# Patient Record
Sex: FEMALE | Race: WHITE | Employment: FULL TIME | URBAN - METROPOLITAN AREA
[De-identification: names, ages, dates, MRNs, and addresses within clinical notes are randomized per-mention and may not be internally consistent; named-entity substitution may affect disease eponyms.]

---

## 2017-05-25 ENCOUNTER — OFFICE VISIT (OUTPATIENT)
Dept: INTERNAL MEDICINE CLINIC | Age: 22
End: 2017-05-25

## 2017-05-25 VITALS
RESPIRATION RATE: 16 BRPM | HEART RATE: 80 BPM | WEIGHT: 133 LBS | HEIGHT: 67 IN | DIASTOLIC BLOOD PRESSURE: 80 MMHG | BODY MASS INDEX: 20.88 KG/M2 | SYSTOLIC BLOOD PRESSURE: 122 MMHG

## 2017-05-25 DIAGNOSIS — Z00.00 ANNUAL PHYSICAL EXAM: Primary | ICD-10-CM

## 2017-05-25 PROCEDURE — 99385 PREV VISIT NEW AGE 18-39: CPT | Performed by: INTERNAL MEDICINE

## 2017-05-25 RX ORDER — NORGESTIMATE AND ETHINYL ESTRADIOL 0.25-0.035
1 KIT ORAL DAILY
COMMUNITY
End: 2018-04-11 | Stop reason: SDUPTHER

## 2018-04-11 ENCOUNTER — OFFICE VISIT (OUTPATIENT)
Dept: INTERNAL MEDICINE CLINIC | Age: 23
End: 2018-04-11

## 2018-04-11 VITALS
OXYGEN SATURATION: 100 % | DIASTOLIC BLOOD PRESSURE: 72 MMHG | SYSTOLIC BLOOD PRESSURE: 118 MMHG | BODY MASS INDEX: 23.21 KG/M2 | HEART RATE: 108 BPM | WEIGHT: 146 LBS

## 2018-04-11 DIAGNOSIS — Z00.00 ANNUAL PHYSICAL EXAM: ICD-10-CM

## 2018-04-11 DIAGNOSIS — Z30.40 ENCOUNTER FOR SURVEILLANCE OF CONTRACEPTIVES, UNSPECIFIED CONTRACEPTIVE: ICD-10-CM

## 2018-04-11 DIAGNOSIS — Z01.419 ENCOUNTER FOR GYNECOLOGICAL EXAMINATION WITHOUT ABNORMAL FINDING: Primary | ICD-10-CM

## 2018-04-11 PROCEDURE — 99395 PREV VISIT EST AGE 18-39: CPT | Performed by: INTERNAL MEDICINE

## 2018-04-11 RX ORDER — NORGESTIMATE AND ETHINYL ESTRADIOL 0.25-0.035
1 KIT ORAL DAILY
Qty: 1 PACKET | Refills: 12 | Status: SHIPPED | OUTPATIENT
Start: 2018-04-11 | End: 2019-04-15 | Stop reason: SDUPTHER

## 2018-10-11 LAB
CHOLESTEROL, TOTAL: 176 MG/DL
CHOLESTEROL/HDL RATIO: 1.9
GLUCOSE BLD-MCNC: 83 MG/DL
HDLC SERPL-MCNC: 92 MG/DL (ref 35–70)
LDL CHOLESTEROL CALCULATED: 62 MG/DL (ref 0–160)
TRIGL SERPL-MCNC: 110 MG/DL
VLDLC SERPL CALC-MCNC: ABNORMAL MG/DL

## 2019-04-15 ENCOUNTER — OFFICE VISIT (OUTPATIENT)
Dept: INTERNAL MEDICINE CLINIC | Age: 24
End: 2019-04-15
Payer: COMMERCIAL

## 2019-04-15 VITALS
HEIGHT: 66 IN | HEART RATE: 90 BPM | WEIGHT: 150 LBS | OXYGEN SATURATION: 98 % | DIASTOLIC BLOOD PRESSURE: 62 MMHG | BODY MASS INDEX: 24.11 KG/M2 | SYSTOLIC BLOOD PRESSURE: 118 MMHG

## 2019-04-15 DIAGNOSIS — Z30.40 ENCOUNTER FOR SURVEILLANCE OF CONTRACEPTIVES, UNSPECIFIED CONTRACEPTIVE: ICD-10-CM

## 2019-04-15 DIAGNOSIS — Z00.00 ANNUAL PHYSICAL EXAM: Primary | ICD-10-CM

## 2019-04-15 DIAGNOSIS — Z11.4 SCREENING FOR HIV (HUMAN IMMUNODEFICIENCY VIRUS): ICD-10-CM

## 2019-04-15 PROCEDURE — 99395 PREV VISIT EST AGE 18-39: CPT | Performed by: INTERNAL MEDICINE

## 2019-04-15 RX ORDER — NORGESTIMATE AND ETHINYL ESTRADIOL 0.25-0.035
1 KIT ORAL DAILY
Qty: 1 PACKET | Refills: 12 | Status: SHIPPED | OUTPATIENT
Start: 2019-04-15 | End: 2020-04-09 | Stop reason: SDUPTHER

## 2019-04-15 ASSESSMENT — PATIENT HEALTH QUESTIONNAIRE - PHQ9
SUM OF ALL RESPONSES TO PHQ9 QUESTIONS 1 & 2: 0
SUM OF ALL RESPONSES TO PHQ QUESTIONS 1-9: 0
2. FEELING DOWN, DEPRESSED OR HOPELESS: 0
SUM OF ALL RESPONSES TO PHQ QUESTIONS 1-9: 0
1. LITTLE INTEREST OR PLEASURE IN DOING THINGS: 0

## 2019-04-15 NOTE — PROGRESS NOTES
[facility]  History and Physical  Jhonny Brooks M.D.        David Sanchez  YOB: 1995    Date of Service:  4/15/2019    Chief Complaint:   David Sanchez is a 26 y.o. female who presents for   Chief Complaint   Patient presents with    Annual Exam       HPI: Here for Annual Physical and Follow up. Wt Readings from Last 3 Encounters:   04/15/19 150 lb (68 kg)   04/11/18 146 lb (66.2 kg)   05/25/17 133 lb (60.3 kg)     BP Readings from Last 3 Encounters:   04/15/19 118/62   04/11/18 118/72   05/25/17 122/80       There is no problem list on file for this patient. No Known Allergies  Outpatient Medications Marked as Taking for the 4/15/19 encounter (Office Visit) with Lashay Rodas MD   Medication Sig Dispense Refill    norgestimate-ethinyl estradiol ([id number]) 0.25-35 MG-MCG per tablet Take 1 tablet by mouth daily 1 packet 12       History reviewed. No pertinent past medical history. History reviewed. No pertinent surgical history.   Family History   Problem Relation Age of Onset    Diabetes Maternal Grandfather     Heart Attack Maternal Grandfather     High Blood Pressure Maternal Grandfather      Social History     Socioeconomic History    Marital status: Single     Spouse name: Not on file    Number of children: Not on file    Years of education: Not on file    Highest education level: Not on file   Occupational History    Not on file   Social Needs    Financial resource strain: Not on file    Food insecurity:     Worry: Not on file     Inability: Not on file    Transportation needs:     Medical: Not on file     Non-medical: Not on file   Tobacco Use    Smoking status: Never Smoker    Smokeless tobacco: Never Used   Substance and Sexual Activity    Alcohol use: Yes     Comment: Socially     Drug use: No    Sexual activity: Yes     Partners: Male   Lifestyle    Physical activity:     Days per week: Not on file     Minutes per session: Not on file    Stress: Not on file   Relationships    Social connections:     Talks on phone: Not on file     Gets together: Not on file     Attends Baptism service: Not on file     Active member of club or organization: Not on file     Attends meetings of clubs or organizations: Not on file     Relationship status: Not on file    Intimate partner violence:     Fear of current or ex partner: Not on file     Emotionally abused: Not on file     Physically abused: Not on file     Forced sexual activity: Not on file   Other Topics Concern    Not on file   Social History Narrative    Not on file       Review of Systems:  A comprehensive review of systems was negative except for what was noted in the HPI. Physical Exam:   Vitals:    04/15/19 1409   BP: 118/62   Pulse: 90   SpO2: 98%   Weight: 150 lb (68 kg)   Height: 5' 6\" (1.676 m)     Body mass index is 24.21 kg/m². Constitutional: She is oriented to person, place, and time. She appears well-developed and well-nourished. No distress. HEENT:   Head: Normocephalic and atraumatic. Right Ear: Tympanic membrane, external ear and ear canal normal.   Left Ear: Tympanic membrane, external ear and ear canal normal.   Mouth/Throat: Oropharynx is clear and moist, and mucous membranes are normal.  There is no cervical adenopathy. Eyes: Conjunctivae and extraocular motions are normal. Pupils are equal, round, and reactive to light. Neck: Supple. No JVD present. Carotid bruit is not present. No mass and no thyromegaly present. Cardiovascular: Normal rate, regular rhythm, normal heart sounds and intact distal pulses. Exam reveals no gallop and no friction rub. No murmur heard. Pulmonary/Chest: Effort normal and breath sounds normal. No respiratory distress. She has no wheezes, rhonchi or rales. Abdominal: Soft, non-tender. Bowel sounds and aorta are normal. She exhibits no organomegaly, mass or bruit. Musculoskeletal: Normal range of motion, no synovitis.  She exhibits no edema. Neurological: She is alert and oriented to person, place, and time. She has normal reflexes. No cranial nerve deficit. Coordination normal.   Skin: Skin is warm and dry. There is no rash or erythema. No suspicious lesions noted. Psychiatric: She has a normal mood and affect. Her speech is normal and behavior is normal. Judgment, cognition and memory are normal.       No results found for: LABA1C, LABMICR  Lab Results   Component Value Date    GLUCOSE 83 10/11/2018     Lab Results   Component Value Date    CHOL 176 10/11/2018    TRIG 110 10/11/2018    HDL 92 10/11/2018    LDLCALC 62 10/11/2018     No results found for: ALT, AST  No results found for: TSH, T4FREE  No results found for: WBC, HGB, HCT, MCV, PLT  No results found for: INR   No results found for: PSA   No results found for: [facility]         Preventive Care:  Health Maintenance   Topic Date Due    Varicella Vaccine (1 of 2 - 13+ 2-dose series) 08/02/2009    HPV vaccine (1 - Female 3-dose series) 08/02/2011    HIV screen  08/02/2011    Chlamydia screen  08/02/2012    DTaP/Tdap/Td vaccine (1 - Tdap) 08/02/2015    Flu vaccine (Season Ended) 09/01/2019    Cervical cancer screen  04/11/2021    Pneumococcal 0-64 years Vaccine  Aged Out      Hx abnormal PAP: no  Sexual activity: has sex with males   Last eye exam: 2018, normal  Exercise: aerobics 4 time(s) per week  Seatbelt use: yes       [facility]        4/15/2019           Preventive Measures Status       Recommendations for screening                   Diabetes Screen  Glucose (mg/dL)   Date Value   10/11/2018 83    Test recommended and ordered   Cholesterol Screen  Lab Results   Component Value Date    CHOL 176 10/11/2018    TRIG 110 10/11/2018    HDL 92 (A) 10/11/2018    LDLCALC 62 10/11/2018    Test recommended and ordered   Aspirin for Cardiovascular Prevention   No Not indicated   Weight: Body mass index is 24.21 kg/m².   5' 6\" (1.676 m)150 lb (68 kg) Your BMI is between 18.5 and 24.9, which indicates that you are at a healthy weight        Recommended Immunizations      There is no immunization history on file for this patient. Influenza vaccine:  recommended every fall         Other Recommendations ·   See a dentist every 6 months  · Try to get at least 30 minutes of exercise 3-5 days per week  · Always wear a seat belt when traveling in a car  · Always wear a helmet when riding a bicycle or motorcycle  · When exposed to the sun, use a sunscreen that protects against both UVA and UVB radiation with an SPF of 30 or greater- reapply every 2 to 3 hours or after sweating, drying off with a towel, or swimming                 Assessment/Plan:    Boone Maloney was seen today for annual exam.    Diagnoses and all orders for this visit:    Annual physical exam  -     Lipid Panel; Future  -     Comprehensive Metabolic Panel; Future  -     CBC with Differential; Future    Screening for HIV (human immunodeficiency virus)  -     HIV Screen    Encounter for surveillance of contraceptives, unspecified contraceptive  -     norgestimate-ethinyl estradiol ([id number]) 0.25-35 MG-MCG per tablet; Take 1 tablet by mouth daily        Return Fasting Physical in 1 year.

## 2020-04-09 ENCOUNTER — TELEMEDICINE (OUTPATIENT)
Dept: INTERNAL MEDICINE CLINIC | Age: 25
End: 2020-04-09
Payer: COMMERCIAL

## 2020-04-09 VITALS — BODY MASS INDEX: 24.11 KG/M2 | HEIGHT: 66 IN | WEIGHT: 150 LBS

## 2020-04-09 PROCEDURE — 99395 PREV VISIT EST AGE 18-39: CPT | Performed by: INTERNAL MEDICINE

## 2020-04-09 RX ORDER — NORGESTIMATE AND ETHINYL ESTRADIOL 0.25-0.035
1 KIT ORAL DAILY
Qty: 1 PACKET | Refills: 12 | Status: SHIPPED
Start: 2020-04-09 | End: 2021-04-01 | Stop reason: ALTCHOICE

## 2021-04-01 ENCOUNTER — OFFICE VISIT (OUTPATIENT)
Dept: INTERNAL MEDICINE CLINIC | Age: 26
End: 2021-04-01
Payer: COMMERCIAL

## 2021-04-01 VITALS
SYSTOLIC BLOOD PRESSURE: 120 MMHG | HEART RATE: 101 BPM | HEIGHT: 66 IN | DIASTOLIC BLOOD PRESSURE: 62 MMHG | BODY MASS INDEX: 24.59 KG/M2 | WEIGHT: 153 LBS | TEMPERATURE: 97.4 F | OXYGEN SATURATION: 100 %

## 2021-04-01 DIAGNOSIS — Z30.40 ENCOUNTER FOR SURVEILLANCE OF CONTRACEPTIVES, UNSPECIFIED CONTRACEPTIVE: ICD-10-CM

## 2021-04-01 DIAGNOSIS — Z00.00 ANNUAL PHYSICAL EXAM: ICD-10-CM

## 2021-04-01 DIAGNOSIS — Z11.8 SCREENING FOR CHLAMYDIAL DISEASE: ICD-10-CM

## 2021-04-01 DIAGNOSIS — Z01.419 PAP SMEAR, AS PART OF ROUTINE GYNECOLOGICAL EXAMINATION: Primary | ICD-10-CM

## 2021-04-01 LAB
A/G RATIO: 1.7 (ref 1.1–2.2)
ALBUMIN SERPL-MCNC: 4.8 G/DL (ref 3.4–5)
ALP BLD-CCNC: 72 U/L (ref 40–129)
ALT SERPL-CCNC: 6 U/L (ref 10–40)
ANION GAP SERPL CALCULATED.3IONS-SCNC: 11 MMOL/L (ref 3–16)
AST SERPL-CCNC: 16 U/L (ref 15–37)
BASOPHILS ABSOLUTE: 0 K/UL (ref 0–0.2)
BASOPHILS RELATIVE PERCENT: 0.4 %
BILIRUB SERPL-MCNC: 0.5 MG/DL (ref 0–1)
BUN BLDV-MCNC: 12 MG/DL (ref 7–20)
CALCIUM SERPL-MCNC: 9.4 MG/DL (ref 8.3–10.6)
CHLORIDE BLD-SCNC: 100 MMOL/L (ref 99–110)
CHOLESTEROL, TOTAL: 227 MG/DL (ref 0–199)
CO2: 24 MMOL/L (ref 21–32)
CREAT SERPL-MCNC: 0.9 MG/DL (ref 0.6–1.1)
EOSINOPHILS ABSOLUTE: 0.1 K/UL (ref 0–0.6)
EOSINOPHILS RELATIVE PERCENT: 1.4 %
GFR AFRICAN AMERICAN: >60
GFR NON-AFRICAN AMERICAN: >60
GLOBULIN: 2.9 G/DL
GLUCOSE BLD-MCNC: 85 MG/DL (ref 70–99)
HCT VFR BLD CALC: 41.5 % (ref 36–48)
HDLC SERPL-MCNC: 65 MG/DL (ref 40–60)
HEMOGLOBIN: 14.1 G/DL (ref 12–16)
LDL CHOLESTEROL CALCULATED: 141 MG/DL
LYMPHOCYTES ABSOLUTE: 1.1 K/UL (ref 1–5.1)
LYMPHOCYTES RELATIVE PERCENT: 21.3 %
MCH RBC QN AUTO: 30.3 PG (ref 26–34)
MCHC RBC AUTO-ENTMCNC: 34 G/DL (ref 31–36)
MCV RBC AUTO: 89.1 FL (ref 80–100)
MONOCYTES ABSOLUTE: 0.4 K/UL (ref 0–1.3)
MONOCYTES RELATIVE PERCENT: 6.7 %
NEUTROPHILS ABSOLUTE: 3.7 K/UL (ref 1.7–7.7)
NEUTROPHILS RELATIVE PERCENT: 70.2 %
PDW BLD-RTO: 13 % (ref 12.4–15.4)
PLATELET # BLD: 249 K/UL (ref 135–450)
PMV BLD AUTO: 8.4 FL (ref 5–10.5)
POTASSIUM SERPL-SCNC: 4.6 MMOL/L (ref 3.5–5.1)
RBC # BLD: 4.65 M/UL (ref 4–5.2)
SODIUM BLD-SCNC: 135 MMOL/L (ref 136–145)
TOTAL PROTEIN: 7.7 G/DL (ref 6.4–8.2)
TRIGL SERPL-MCNC: 107 MG/DL (ref 0–150)
VLDLC SERPL CALC-MCNC: 21 MG/DL
WBC # BLD: 5.3 K/UL (ref 4–11)

## 2021-04-01 PROCEDURE — 36415 COLL VENOUS BLD VENIPUNCTURE: CPT | Performed by: INTERNAL MEDICINE

## 2021-04-01 PROCEDURE — 99395 PREV VISIT EST AGE 18-39: CPT | Performed by: INTERNAL MEDICINE

## 2021-04-01 RX ORDER — LEVONORGESTREL AND ETHINYL ESTRADIOL 0.15-0.03
1 KIT ORAL DAILY
Qty: 1 PACKET | Refills: 3 | Status: SHIPPED | OUTPATIENT
Start: 2021-04-01 | End: 2022-03-31 | Stop reason: SDUPTHER

## 2021-04-01 SDOH — ECONOMIC STABILITY: FOOD INSECURITY: WITHIN THE PAST 12 MONTHS, THE FOOD YOU BOUGHT JUST DIDN'T LAST AND YOU DIDN'T HAVE MONEY TO GET MORE.: NEVER TRUE

## 2021-04-01 SDOH — ECONOMIC STABILITY: FOOD INSECURITY: WITHIN THE PAST 12 MONTHS, YOU WORRIED THAT YOUR FOOD WOULD RUN OUT BEFORE YOU GOT MONEY TO BUY MORE.: NEVER TRUE

## 2021-04-01 SDOH — ECONOMIC STABILITY: TRANSPORTATION INSECURITY
IN THE PAST 12 MONTHS, HAS THE LACK OF TRANSPORTATION KEPT YOU FROM MEDICAL APPOINTMENTS OR FROM GETTING MEDICATIONS?: NO

## 2021-04-01 ASSESSMENT — PATIENT HEALTH QUESTIONNAIRE - PHQ9
SUM OF ALL RESPONSES TO PHQ9 QUESTIONS 1 & 2: 0
1. LITTLE INTEREST OR PLEASURE IN DOING THINGS: 0
SUM OF ALL RESPONSES TO PHQ QUESTIONS 1-9: 0

## 2021-04-01 NOTE — PROGRESS NOTES
Not on file     Minutes per session: Not on file    Stress: Not on file   Relationships    Social connections     Talks on phone: Not on file     Gets together: Not on file     Attends Judaism service: Not on file     Active member of club or organization: Not on file     Attends meetings of clubs or organizations: Not on file     Relationship status: Not on file    Intimate partner violence     Fear of current or ex partner: Not on file     Emotionally abused: Not on file     Physically abused: Not on file     Forced sexual activity: Not on file   Other Topics Concern    Not on file   Social History Narrative    Not on file       Preventive Care and Risk Factor Assessment  Health Maintenance   Topic Date Due    COVID-19 Vaccine (1) Never done    Chlamydia screen  Never done    Cervical cancer screen  04/11/2021    DTaP/Tdap/Td vaccine (8 - Td) 05/19/2027    Hepatitis A vaccine  Completed    Hepatitis B vaccine  Completed    Hib vaccine  Completed    HPV vaccine  Completed    Varicella vaccine  Completed    Meningococcal (ACWY) vaccine  Completed    Flu vaccine  Completed    Pneumococcal 0-64 years Vaccine  Aged Out    Hepatitis C screen  Discontinued    HIV screen  Discontinued      Hx abnormal PAP: no  Sexual activity: has sex with males.   Hx of STD: no  Hx of abnormal mammogram: no  FH of breast cancer: no  FH of GYN cancer: no   Exercise: aerobics 4 time(s) per week  Social History     Tobacco Use   Smoking Status Never Smoker   Smokeless Tobacco Never Used      Social History     Substance and Sexual Activity   Alcohol Use Yes    Comment: Socially         Immunization History   Administered Date(s) Administered    DTP/HiB 1996, 1996, 02/10/1997    DTaP (Infanrix) 02/10/1998, 07/06/2001    HPV Quadrivalent (Gardasil) 08/01/2011, 10/31/2011, 06/27/2012    Hepatitis A Ped/Adol (Havrix, Vaqta) 04/22/2014, 05/19/2015    Hepatitis B Ped/Adol (Engerix-B, Recombivax HB) 1996, 1996, 05/05/1997    Hib PRP-OMP (PedvaxHIB) 11/11/1997    MMR 11/11/1997, 08/03/2000    Meningococcal MCV4P (Menactra) 07/10/2008, 04/22/2014    Polio IPV (IPOL) 1996, 1996, 02/10/1998, 07/06/2001    Tdap (Boostrix, Adacel) 07/10/2008, 05/19/2017    Varicella (Varivax) 09/04/1997, 06/02/2010       Review of Systems:  A comprehensive review of systems was negative except for what was noted in the HPI. Wt Readings from Last 3 Encounters:   04/01/21 153 lb (69.4 kg)   04/09/20 150 lb (68 kg)   04/15/19 150 lb (68 kg)     BP Readings from Last 3 Encounters:   04/01/21 120/62   04/15/19 118/62   04/11/18 118/72       Physical Exam:  Vitals:    04/01/21 0955   BP: 120/62   Pulse: 101   Temp: 97.4 °F (36.3 °C)   TempSrc: Infrared   SpO2: 100%   Weight: 153 lb (69.4 kg)   Height: 5' 6\" (1.676 m)      Body mass index is 24.69 kg/m². Constitutional: She is oriented to person, place, and time. She appears well-developed and well-nourished. No distress. Neck: No mass and no thyromegaly present. Cardiovascular: Normal rate, regular rhythm and normal heart sounds. No murmur heard. Pulmonary/Chest: Effort and breath sounds normal.   Abdominal: Soft, non-tender. No distension or masses. Genitourinary: normal external genitalia, vulva, vagina, cervix, uterus and adnexa. Breast exam:  breasts appear normal, no suspicious masses, no skin or nipple changes or axillary nodes, no axillary lymphadenopathy, risk and benefit of breast self-exam was discussed. Neurological: She is alert and oriented to person, place, and time. Skin: Skin is warm and dry. No rash noted. No erythema. Psychiatric: She has a normal mood and affect.  Her behavior is normal.     No results found for: LABA1C, LABMICR  Lab Results   Component Value Date    GLUCOSE 83 10/11/2018     Lab Results   Component Value Date    CHOL 176 10/11/2018    TRIG 110 10/11/2018    HDL 92 10/11/2018    LDLCALC 62 10/11/2018     No results use a sunscreen that protects against both UVA and UVB radiation with an SPF of 30 or greater- reapply every 2 to 3 hours or after sweating, drying off with a towel, or swimming  · You need 500 mg of calcium and 2719-6724 IU of vitamin D per day- it is possible to meet your calcium requirement with diet alone, but a vitamin D supplement is usually necessary                 Assessment/Plan:  Narda Almeida was seen today for annual exam and gynecologic exam.    Diagnoses and all orders for this visit:    Pap smear, as part of routine gynecological examination  -     PAP SMEAR    Annual physical exam  -     Lipid Panel  -     Comprehensive Metabolic Panel  -     CBC Auto Differential    Screening for chlamydial disease  -     Chlamydia trachomatis DNA, Thin Prep    Encounter for surveillance of contraceptives, unspecified contraceptive  -     levonorgestrel-ethinyl estradiol (SEASONALE) 0.15-0.03 MG per tablet; Take 1 tablet by mouth daily        Return Fassting Physical in 1 year.

## 2021-04-02 LAB — C. TRACHOMATIS DNA,THIN PREP: NEGATIVE

## 2021-04-07 DIAGNOSIS — R87.610 ATYPICAL SQUAMOUS CELLS OF UNDETERMINED SIGNIFICANCE ON CYTOLOGIC SMEAR OF CERVIX (ASC-US): Primary | ICD-10-CM

## 2021-04-07 LAB
HPV COMMENT: ABNORMAL
HPV TYPE 16: NOT DETECTED
HPV TYPE 18: NOT DETECTED
HPVOH (OTHER TYPES): DETECTED

## 2021-05-09 ENCOUNTER — TELEPHONE (OUTPATIENT)
Dept: OBGYN CLINIC | Age: 26
End: 2021-05-09

## 2021-05-10 ENCOUNTER — OFFICE VISIT (OUTPATIENT)
Dept: OBGYN CLINIC | Age: 26
End: 2021-05-10
Payer: COMMERCIAL

## 2021-05-10 VITALS
SYSTOLIC BLOOD PRESSURE: 120 MMHG | HEIGHT: 66 IN | BODY MASS INDEX: 24.53 KG/M2 | WEIGHT: 152.6 LBS | HEART RATE: 77 BPM | DIASTOLIC BLOOD PRESSURE: 76 MMHG | TEMPERATURE: 98.3 F

## 2021-05-10 DIAGNOSIS — Z30.09 GENERAL COUNSELING ON PRESCRIPTION OF ORAL CONTRACEPTIVES: ICD-10-CM

## 2021-05-10 DIAGNOSIS — R87.810 ASCUS WITH POSITIVE HIGH RISK HPV CERVICAL: ICD-10-CM

## 2021-05-10 DIAGNOSIS — Z87.42 HX OF ABNORMAL CERVICAL PAP SMEAR: Primary | ICD-10-CM

## 2021-05-10 DIAGNOSIS — R87.610 ASCUS WITH POSITIVE HIGH RISK HPV CERVICAL: ICD-10-CM

## 2021-05-10 PROCEDURE — 99202 OFFICE O/P NEW SF 15 MIN: CPT | Performed by: OBSTETRICS & GYNECOLOGY

## 2021-05-10 ASSESSMENT — ENCOUNTER SYMPTOMS
NAUSEA: 0
CHEST TIGHTNESS: 0
DIARRHEA: 0
SORE THROAT: 0
BACK PAIN: 0
ABDOMINAL PAIN: 0
VOMITING: 0
SHORTNESS OF BREATH: 0
WHEEZING: 0
COLOR CHANGE: 0

## 2021-05-10 NOTE — PROGRESS NOTES
Gynecologic History:   Y history of abnormal pap smears   - ASCUS / HPV other (+) 2021    - NILM in 2018   Denies history of of STIs    Menstrual history:  Gynecologic History  Menstrual History:   LMP: Patient's last menstrual period was 2021.  Age of Menarche: 16   Menstrual Period: regular   Interval Between Menses: 28d   Duration of Menses: 5-7d   Menstrual Flow: moderate   o Tampons during day 2-3x   o Pads at night  o Only occasional BTB   Bleeding between menses: denies   Post Menopausal Bleeding: NA     Sexual History:   Contraception: Seasonale     Currently IS sexually active   Denies sexual problems    Obstetrical History:  OB History        0    Para   0    Term   0       0    AB   0    Living   0       SAB   0    TAB   0    Ectopic   0    Molar   0    Multiple   0    Live Births   0                Past Medical History:   Past Medical History:   Diagnosis Date    Abnormal Pap smear of cervix        Medications:  Current Outpatient Medications   Medication Sig Dispense Refill    levonorgestrel-ethinyl estradiol (SEASONALE) 0.15-0.03 MG per tablet Take 1 tablet by mouth daily 1 packet 3     No current facility-administered medications for this visit. Allergies:  Patient has no known allergies. Surgical History:  History reviewed. No pertinent surgical history.     Family History:  Family History   Problem Relation Age of Onset    Diabetes Maternal Grandfather     Heart Attack Maternal Grandfather     High Blood Pressure Maternal Grandfather        Social History:  Social History     Substance and Sexual Activity   Alcohol Use Yes    Comment: Socially      Social History     Substance and Sexual Activity   Drug Use No     Social History     Tobacco Use   Smoking Status Never Smoker   Smokeless Tobacco Never Used       Physical Exam:  /76 (Site: Left Upper Arm, Position: Sitting, Cuff Size: Medium Adult)   Pulse 77   Temp 98.3 °F (36.8 °C) (Infrared)   Ht 5' 6\" (1.676 m)   Wt 152 lb 9.6 oz (69.2 kg)   LMP 03/23/2021   BMI 24.63 kg/m²   General: Alert, well appearing, no acute distress  Head: Normocephalic, atraumatic  Mouth: Mucous membranes moist, pharynx normal without lesions  Thyroid: No thyromegaly or masses present   Breasts: Deferred   Lungs: Resp effort normal   CV: Regular rate   Abdomen: Soft, nontender, nondistended   Pelvic: deferred, annual recently done   Rectovaginal: deferred  Extremities: No redness or tenderness, neg Reji's sign  Skin: Well perfused, normal coloration and turgor, no lesions or rashes visualized  Neuro: Alert, oriented, normal speech, no focal deficits, moves extremities appropriately  Osteopathic: No TART changes    Labs:  No visits with results within 1 Day(s) from this visit.    Latest known visit with results is:   Office Visit on 04/01/2021   Component Date Value    C. trachomatis DNA,Thin * 04/01/2021 Negative     Cholesterol, Total 04/01/2021 227*    Triglycerides 04/01/2021 107     HDL 04/01/2021 65*    LDL Calculated 04/01/2021 141*    VLDL Cholesterol Calcula* 04/01/2021 21     Sodium 04/01/2021 135*    Potassium 04/01/2021 4.6     Chloride 04/01/2021 100     CO2 04/01/2021 24     Anion Gap 04/01/2021 11     Glucose 04/01/2021 85     BUN 04/01/2021 12     CREATININE 04/01/2021 0.9     GFR Non- 04/01/2021 >60     GFR  04/01/2021 >60     Calcium 04/01/2021 9.4     Total Protein 04/01/2021 7.7     Albumin 04/01/2021 4.8     Albumin/Globulin Ratio 04/01/2021 1.7     Total Bilirubin 04/01/2021 0.5     Alkaline Phosphatase 04/01/2021 72     ALT 04/01/2021 6*    AST 04/01/2021 16     Globulin 04/01/2021 2.9     WBC 04/01/2021 5.3     RBC 04/01/2021 4.65     Hemoglobin 04/01/2021 14.1     Hematocrit 04/01/2021 41.5     MCV 04/01/2021 89.1     MCH 04/01/2021 30.3     MCHC 04/01/2021 34.0     RDW 04/01/2021 13.0     Platelets 97/12/1671 249     MPV

## 2022-01-26 ENCOUNTER — OFFICE VISIT (OUTPATIENT)
Dept: INTERNAL MEDICINE CLINIC | Age: 27
End: 2022-01-26
Payer: COMMERCIAL

## 2022-01-26 VITALS
WEIGHT: 149 LBS | OXYGEN SATURATION: 98 % | BODY MASS INDEX: 23.95 KG/M2 | HEART RATE: 88 BPM | SYSTOLIC BLOOD PRESSURE: 116 MMHG | HEIGHT: 66 IN | DIASTOLIC BLOOD PRESSURE: 74 MMHG

## 2022-01-26 DIAGNOSIS — K64.4 EXTERNAL HEMORRHOID: Primary | ICD-10-CM

## 2022-01-26 PROCEDURE — 99212 OFFICE O/P EST SF 10 MIN: CPT | Performed by: INTERNAL MEDICINE

## 2022-01-26 NOTE — PROGRESS NOTES
Alvin Francisco  YOB: 1995    Date of Service:  1/26/2022    Chief Complaint:      Chief Complaint   Patient presents with    Rectal Pain     thinks she may have a hemmorhoid, no bleeding, but pain is worse with bowel movements. Assessment/Plan:  Ezra Kerns was seen today for rectal pain. Diagnoses and all orders for this visit:    External hemorrhoid    resolve now but can try over the counter PrepH and if not better. Return Keep Fasting Physical 3/31. HPI:  Alvin Francisco is a 23 y.o. She complain of pain in anal area that started 2 weeks ago and got better but it came back a week ago. She does feel a bump around anal area. It's better now but still can feel something. No results found for: LABA1C, LABMICR  Lab Results   Component Value Date     (L) 04/01/2021    K 4.6 04/01/2021     04/01/2021    CO2 24 04/01/2021    BUN 12 04/01/2021    CREATININE 0.9 04/01/2021    GLUCOSE 85 04/01/2021    CALCIUM 9.4 04/01/2021     Lab Results   Component Value Date    CHOL 227 04/01/2021    TRIG 107 04/01/2021    HDL 65 04/01/2021    LDLCALC 141 04/01/2021     Lab Results   Component Value Date    ALT 6 (L) 04/01/2021    AST 16 04/01/2021     No results found for: TSH, T4FREE  Lab Results   Component Value Date    WBC 5.3 04/01/2021    HGB 14.1 04/01/2021    HCT 41.5 04/01/2021    MCV 89.1 04/01/2021     04/01/2021     No results found for: INR   No results found for: PSA   No results found for: LABURIC     There is no problem list on file for this patient.       No Known Allergies  Outpatient Medications Marked as Taking for the 1/26/22 encounter (Office Visit) with Kenneth Rodas MD   Medication Sig Dispense Refill    sertraline (ZOLOFT) 50 MG tablet Take 50 mg by mouth daily      levonorgestrel-ethinyl estradiol (SEASONALE) 0.15-0.03 MG per tablet Take 1 tablet by mouth daily 1 packet 3         Review of Systems: 14 systems were negative except of what was stated on HPI    Nursing note and vitals reviewed. Vitals:    01/26/22 1433   BP: 116/74   Pulse: 88   SpO2: 98%   Weight: 149 lb (67.6 kg)   Height: 5' 6\" (1.676 m)     Wt Readings from Last 3 Encounters:   01/26/22 149 lb (67.6 kg)   05/10/21 152 lb 9.6 oz (69.2 kg)   04/01/21 153 lb (69.4 kg)     BP Readings from Last 3 Encounters:   01/26/22 116/74   05/10/21 120/76   04/01/21 120/62     Body mass index is 24.05 kg/m². Constitutional: Patient appears well-developed and well-nourished. No distress. Head: Normocephalic and atraumatic. Neck: Normal range of motion. Neck supple. No thyroidmegaly. Cardiovascular: Normal rate, regular rhythm, normal heart sounds and intact distal pulses. Pulmonary/Chest: Effort normal and breath sounds normal. No stridor. No respiratory distress. No wheezes and no rales. Abdominal: Soft. Bowel sounds are normal. No distension and no mass. No tenderness. No rebound and no guarding. Musculoskeletal: No edema and no tenderness. Skin: No rash or erythema.

## 2022-02-14 ENCOUNTER — OFFICE VISIT (OUTPATIENT)
Dept: INTERNAL MEDICINE CLINIC | Age: 27
End: 2022-02-14
Payer: COMMERCIAL

## 2022-02-14 VITALS
WEIGHT: 151 LBS | SYSTOLIC BLOOD PRESSURE: 110 MMHG | OXYGEN SATURATION: 99 % | HEIGHT: 66 IN | HEART RATE: 94 BPM | DIASTOLIC BLOOD PRESSURE: 70 MMHG | BODY MASS INDEX: 24.27 KG/M2

## 2022-02-14 DIAGNOSIS — L02.31 CELLULITIS AND ABSCESS OF BUTTOCK: Primary | ICD-10-CM

## 2022-02-14 DIAGNOSIS — L03.317 CELLULITIS AND ABSCESS OF BUTTOCK: Primary | ICD-10-CM

## 2022-02-14 PROCEDURE — 99213 OFFICE O/P EST LOW 20 MIN: CPT | Performed by: INTERNAL MEDICINE

## 2022-02-14 RX ORDER — CEPHALEXIN 500 MG/1
500 CAPSULE ORAL 4 TIMES DAILY
Qty: 40 CAPSULE | Refills: 0 | Status: SHIPPED | OUTPATIENT
Start: 2022-02-14 | End: 2022-02-24

## 2022-02-14 NOTE — PROGRESS NOTES
Juan Miguel Zamarripa  YOB: 1995    Date of Service:  2/14/2022    Chief Complaint:      Chief Complaint   Patient presents with    Rectal Bleeding     small sore on Rt inside of rectum, some bleeding last night       Assessment/Plan:  Waqar Nash was seen today for rectal bleeding. Diagnoses and all orders for this visit:    Cellulitis and abscess of buttock  -     cephALEXin (KEFLEX) 500 MG capsule; Take 1 capsule by mouth 4 times daily for 10 days    Stable and continue on current medications. Return keep Fasting Physical 3/31. HPI:  Juan Miguel Zamarripa is a 23 y.o. She complains of intermittent pain and swelling right buttocks area flaring up every 2 weeks lasting 5 days. No discharge. First episode was in December 2021. She did have bad diarrhea about the time she noticed her first flare. No fever or chills. No results found for: LABA1C, LABMICR  Lab Results   Component Value Date     (L) 04/01/2021    K 4.6 04/01/2021     04/01/2021    CO2 24 04/01/2021    BUN 12 04/01/2021    CREATININE 0.9 04/01/2021    GLUCOSE 85 04/01/2021    CALCIUM 9.4 04/01/2021     Lab Results   Component Value Date    CHOL 227 04/01/2021    TRIG 107 04/01/2021    HDL 65 04/01/2021    LDLCALC 141 04/01/2021     Lab Results   Component Value Date    ALT 6 (L) 04/01/2021    AST 16 04/01/2021     No results found for: TSH, T4FREE  Lab Results   Component Value Date    WBC 5.3 04/01/2021    HGB 14.1 04/01/2021    HCT 41.5 04/01/2021    MCV 89.1 04/01/2021     04/01/2021     No results found for: INR   No results found for: PSA   No results found for: LABURIC     There is no problem list on file for this patient.       No Known Allergies  Outpatient Medications Marked as Taking for the 2/14/22 encounter (Office Visit) with Sandra Rodas MD   Medication Sig Dispense Refill    sertraline (ZOLOFT) 50 MG tablet Take 50 mg by mouth daily      levonorgestrel-ethinyl estradiol (SEASONALE) 0.15-0.03 MG per

## 2022-03-31 ENCOUNTER — OFFICE VISIT (OUTPATIENT)
Dept: INTERNAL MEDICINE CLINIC | Age: 27
End: 2022-03-31
Payer: COMMERCIAL

## 2022-03-31 VITALS
WEIGHT: 150 LBS | HEIGHT: 66 IN | SYSTOLIC BLOOD PRESSURE: 104 MMHG | HEART RATE: 102 BPM | BODY MASS INDEX: 24.11 KG/M2 | OXYGEN SATURATION: 98 % | DIASTOLIC BLOOD PRESSURE: 70 MMHG

## 2022-03-31 DIAGNOSIS — Z30.40 ENCOUNTER FOR SURVEILLANCE OF CONTRACEPTIVES, UNSPECIFIED CONTRACEPTIVE: ICD-10-CM

## 2022-03-31 DIAGNOSIS — F32.A ANXIETY AND DEPRESSION: ICD-10-CM

## 2022-03-31 DIAGNOSIS — Z00.00 ENCOUNTER FOR WELL ADULT EXAM WITHOUT ABNORMAL FINDINGS: Primary | ICD-10-CM

## 2022-03-31 DIAGNOSIS — F41.9 ANXIETY AND DEPRESSION: ICD-10-CM

## 2022-03-31 LAB
A/G RATIO: 1.8 (ref 1.1–2.2)
ALBUMIN SERPL-MCNC: 4.5 G/DL (ref 3.4–5)
ALP BLD-CCNC: 64 U/L (ref 40–129)
ALT SERPL-CCNC: <5 U/L (ref 10–40)
ANION GAP SERPL CALCULATED.3IONS-SCNC: 14 MMOL/L (ref 3–16)
AST SERPL-CCNC: 16 U/L (ref 15–37)
BASOPHILS ABSOLUTE: 0 K/UL (ref 0–0.2)
BASOPHILS RELATIVE PERCENT: 0.6 %
BILIRUB SERPL-MCNC: 0.4 MG/DL (ref 0–1)
BUN BLDV-MCNC: 12 MG/DL (ref 7–20)
CALCIUM SERPL-MCNC: 9.1 MG/DL (ref 8.3–10.6)
CHLORIDE BLD-SCNC: 104 MMOL/L (ref 99–110)
CHOLESTEROL, TOTAL: 192 MG/DL (ref 0–199)
CO2: 19 MMOL/L (ref 21–32)
CREAT SERPL-MCNC: 0.9 MG/DL (ref 0.6–1.1)
EOSINOPHILS ABSOLUTE: 0.1 K/UL (ref 0–0.6)
EOSINOPHILS RELATIVE PERCENT: 2.6 %
GFR AFRICAN AMERICAN: >60
GFR NON-AFRICAN AMERICAN: >60
GLUCOSE BLD-MCNC: 91 MG/DL (ref 70–99)
HCT VFR BLD CALC: 42.8 % (ref 36–48)
HDLC SERPL-MCNC: 57 MG/DL (ref 40–60)
HEMOGLOBIN: 14.3 G/DL (ref 12–16)
LDL CHOLESTEROL CALCULATED: 117 MG/DL
LYMPHOCYTES ABSOLUTE: 1.1 K/UL (ref 1–5.1)
LYMPHOCYTES RELATIVE PERCENT: 24.5 %
MCH RBC QN AUTO: 30.2 PG (ref 26–34)
MCHC RBC AUTO-ENTMCNC: 33.4 G/DL (ref 31–36)
MCV RBC AUTO: 90.4 FL (ref 80–100)
MONOCYTES ABSOLUTE: 0.3 K/UL (ref 0–1.3)
MONOCYTES RELATIVE PERCENT: 7.2 %
NEUTROPHILS ABSOLUTE: 2.8 K/UL (ref 1.7–7.7)
NEUTROPHILS RELATIVE PERCENT: 65.1 %
PDW BLD-RTO: 13.3 % (ref 12.4–15.4)
PLATELET # BLD: 235 K/UL (ref 135–450)
PMV BLD AUTO: 8.4 FL (ref 5–10.5)
POTASSIUM SERPL-SCNC: 4.7 MMOL/L (ref 3.5–5.1)
RBC # BLD: 4.73 M/UL (ref 4–5.2)
SODIUM BLD-SCNC: 137 MMOL/L (ref 136–145)
TOTAL PROTEIN: 7 G/DL (ref 6.4–8.2)
TRIGL SERPL-MCNC: 88 MG/DL (ref 0–150)
VLDLC SERPL CALC-MCNC: 18 MG/DL
WBC # BLD: 4.3 K/UL (ref 4–11)

## 2022-03-31 PROCEDURE — 99395 PREV VISIT EST AGE 18-39: CPT | Performed by: INTERNAL MEDICINE

## 2022-03-31 PROCEDURE — 36415 COLL VENOUS BLD VENIPUNCTURE: CPT | Performed by: INTERNAL MEDICINE

## 2022-03-31 RX ORDER — LEVONORGESTREL AND ETHINYL ESTRADIOL 0.15-0.03
1 KIT ORAL DAILY
Qty: 1 PACKET | Refills: 3 | Status: SHIPPED | OUTPATIENT
Start: 2022-03-31

## 2022-03-31 ASSESSMENT — PATIENT HEALTH QUESTIONNAIRE - PHQ9
SUM OF ALL RESPONSES TO PHQ QUESTIONS 1-9: 0
1. LITTLE INTEREST OR PLEASURE IN DOING THINGS: 0
SUM OF ALL RESPONSES TO PHQ QUESTIONS 1-9: 0
SUM OF ALL RESPONSES TO PHQ QUESTIONS 1-9: 0
2. FEELING DOWN, DEPRESSED OR HOPELESS: 0
SUM OF ALL RESPONSES TO PHQ9 QUESTIONS 1 & 2: 0
SUM OF ALL RESPONSES TO PHQ QUESTIONS 1-9: 0

## 2022-03-31 NOTE — PATIENT INSTRUCTIONS

## 2022-03-31 NOTE — PROGRESS NOTES
[facility]  History and Physical  Radha Yu M.D.        Giana Roche  YOB: 1995    Date of Service:  3/31/2022    Chief Complaint:   Giana Roche is a 23 y.o. female who presents for   Chief Complaint   Patient presents with    Annual Exam       Assessment/Plan:    Rand Allison was seen today for annual exam.    Diagnoses and all orders for this visit:    Encounter for well adult exam without abnormal findings  -     Lipid Panel  -     Comprehensive Metabolic Panel  -     CBC with Auto Differential    Encounter for surveillance of contraceptives, unspecified contraceptive  -     levonorgestrel-ethinyl estradiol (SEASONALE) 0.15-0.03 MG per tablet; Take 1 tablet by mouth daily    Anxiety and depression    Stable and continue on current treatment    Return Fasting Physical in 1 year. HPI: Here for Annual Physical and Follow up.     Anxiety/depression:  Stable on Zoloft 50 mg daily per psych 8/2020    No results found for: LABA1C, LABMICR  Lab Results   Component Value Date     (L) 04/01/2021    K 4.6 04/01/2021     04/01/2021    CO2 24 04/01/2021    BUN 12 04/01/2021    CREATININE 0.9 04/01/2021    GLUCOSE 85 04/01/2021    CALCIUM 9.4 04/01/2021     Lab Results   Component Value Date    CHOL 227 04/01/2021    TRIG 107 04/01/2021    HDL 65 04/01/2021    LDLCALC 141 04/01/2021     Lab Results   Component Value Date    ALT 6 (L) 04/01/2021    AST 16 04/01/2021     No results found for: TSH, T4FREE, T3FREE  Lab Results   Component Value Date    WBC 5.3 04/01/2021    HGB 14.1 04/01/2021    HCT 41.5 04/01/2021    MCV 89.1 04/01/2021     04/01/2021     No results found for: INR   No results found for: PSA   No results found for: LABURIC     Wt Readings from Last 3 Encounters:   03/31/22 150 lb (68 kg)   02/14/22 151 lb (68.5 kg)   01/26/22 149 lb (67.6 kg)     BP Readings from Last 3 Encounters:   03/31/22 104/70   02/14/22 110/70   01/26/22 116/74       There is no problem list on file for this patient. No Known Allergies  Outpatient Medications Marked as Taking for the 3/31/22 encounter (Office Visit) with Barbara Rodas MD   Medication Sig Dispense Refill    sertraline (ZOLOFT) 50 MG tablet Take 50 mg by mouth daily      levonorgestrel-ethinyl estradiol (SEASONALE) 0.15-0.03 MG per tablet Take 1 tablet by mouth daily 1 packet 3       Past Medical History:   Diagnosis Date    Abnormal Pap smear of cervix      History reviewed. No pertinent surgical history. Family History   Problem Relation Age of Onset    Diabetes Maternal Grandfather     Heart Attack Maternal Grandfather     High Blood Pressure Maternal Grandfather      Social History     Socioeconomic History    Marital status: Single     Spouse name: Not on file    Number of children: Not on file    Years of education: Not on file    Highest education level: Not on file   Occupational History    Not on file   Tobacco Use    Smoking status: Never Smoker    Smokeless tobacco: Never Used   Vaping Use    Vaping Use: Never used   Substance and Sexual Activity    Alcohol use: Yes     Comment: Socially     Drug use: No    Sexual activity: Yes     Partners: Male   Other Topics Concern    Not on file   Social History Narrative    Not on file     Social Determinants of Health     Financial Resource Strain: Low Risk     Difficulty of Paying Living Expenses: Not hard at all   Food Insecurity: No Food Insecurity    Worried About Running Out of Food in the Last Year: Never true    Luisito of Food in the Last Year: Never true   Transportation Needs: No Transportation Needs    Lack of Transportation (Medical): No    Lack of Transportation (Non-Medical):  No   Physical Activity:     Days of Exercise per Week: Not on file    Minutes of Exercise per Session: Not on file   Stress:     Feeling of Stress : Not on file   Social Connections:     Frequency of Communication with Friends and Family: Not on file    Frequency of Social Gatherings with Friends and Family: Not on file    Attends Sikhism Services: Not on file    Active Member of Clubs or Organizations: Not on file    Attends Club or Organization Meetings: Not on file    Marital Status: Not on file   Intimate Partner Violence:     Fear of Current or Ex-Partner: Not on file    Emotionally Abused: Not on file    Physically Abused: Not on file    Sexually Abused: Not on file   Housing Stability:     Unable to Pay for Housing in the Last Year: Not on file    Number of [place] in the Last Year: Not on file    Unstable Housing in the Last Year: Not on file       Review of Systems:  A comprehensive review of systems was negative except for what was noted in the HPI. Physical Exam:   Vitals:    03/31/22 0820   BP: 104/70   Pulse: 102   SpO2: 98%   Weight: 150 lb (68 kg)   Height: 5' 6\" (1.676 m)     Body mass index is 24.21 kg/m². Constitutional: She is oriented to person, place, and time. She appears well-developed and well-nourished. No distress. HEENT:   Head: Normocephalic and atraumatic. Right Ear: Tympanic membrane, external ear and ear canal normal.   Left Ear: Tympanic membrane, external ear and ear canal normal.   Mouth/Throat: Oropharynx is clear and moist, and mucous membranes are normal.  There is no cervical adenopathy. Eyes: Conjunctivae and extraocular motions are normal. Pupils are equal, round, and reactive to light. Neck: Supple. No JVD present. Carotid bruit is not present. No mass and no thyromegaly present. Cardiovascular: Normal rate, regular rhythm, normal heart sounds and intact distal pulses. Pulmonary/Chest: Effort normal and breath sounds normal. No respiratory distress. She has no wheezes, rhonchi or rales. Abdominal: Soft, non-tender. Bowel sounds and aorta are normal. She exhibits no organomegaly, mass or bruit. Musculoskeletal: Normal range of motion, no synovitis. She exhibits no edema.    Neurological: She is alert and oriented to person, place, and time. She has normal reflexes. No cranial nerve deficit. Coordination normal.   Skin: Skin is warm and dry. There is no rash or erythema. No suspicious lesions noted.        Preventive Care:  Health Maintenance   Topic Date Due    COVID-19 Vaccine (3 - Booster for Moderna series) 09/30/2021    Depression Screen  04/01/2022    Chlamydia screen  04/01/2022    Pap smear  04/01/2024    DTaP/Tdap/Td vaccine (10 - Td or Tdap) 01/19/2030    Hepatitis A vaccine  Completed    Hepatitis B vaccine  Completed    Hib vaccine  Completed    HPV vaccine  Completed    Varicella vaccine  Completed    Meningococcal (ACWY) vaccine  Completed    Flu vaccine  Completed    Pneumococcal 0-64 years Vaccine  Aged Out    Hepatitis C screen  Discontinued    HIV screen  Discontinued        Recommendations for Preventive Services Due: see orders and patient instructions/AVS.

## 2022-08-01 ENCOUNTER — TELEMEDICINE (OUTPATIENT)
Dept: INTERNAL MEDICINE CLINIC | Age: 27
End: 2022-08-01
Payer: COMMERCIAL

## 2022-08-01 DIAGNOSIS — R31.0 GROSS HEMATURIA: Primary | ICD-10-CM

## 2022-08-01 PROCEDURE — 99213 OFFICE O/P EST LOW 20 MIN: CPT | Performed by: INTERNAL MEDICINE

## 2022-08-01 NOTE — PROGRESS NOTES
Pursuant to the emergency declaration under the [facility] waiver authority and the [employer] and [employer] Act, this Virtual  Video Visit was conducted, with patient's consent, to reduce the patient's risk of exposure to COVID-19 and provide continuity of care. Service is  provided through a video synchronous discussion virtually to substitute for in-person clinic visit with the patient being at home and Dr. Mayur Connelly being at home. Patient consent to the video visit. Date of Service:  8/1/2022    Chief Complaint:      Chief Complaint   Patient presents with    Hematuria       Assessment/Plan:    [place] was seen today for hematuria. Diagnoses and all orders for this visit:    Gross hematuria  -     POCT Urinalysis no Micro; Future  -     AFL - Rhiannon Lopez MD, Urology, [facility]  -     CT ABDOMEN PELVIS W WO CONTRAST Additional Contrast? None; Future  -     MICROSCOPIC URINALYSIS; Future      Return if symptoms worsen or fail to improve. HPI:  Bhupendra Herrera is a 23 y.o. She started noticing blood in the toilet 4 days ago and everytime she wipes after urniating. She also see streaks of blood on her underwear up higher and not near her vaginal area. There is some pressure in lower pelvic area with some frequency and urgency. No flank pain, dysuria, fever or chills. Mom has kidney stones. No other family history of hematuria.     No results found for: Ed Martinez  Lab Results   Component Value Date     03/31/2022    K 4.7 03/31/2022     03/31/2022    CO2 19 (L) 03/31/2022    BUN 12 03/31/2022    CREATININE 0.9 03/31/2022    GLUCOSE 91 03/31/2022    CALCIUM 9.1 03/31/2022     Lab Results   Component Value Date/Time    CHOL 192 03/31/2022 08:36 AM    TRIG 88 03/31/2022 08:36 AM    HDL 57 03/31/2022 08:36 AM    LDLCALC 117 03/31/2022 08:36 AM     Lab Results   Component Value Date    ALT <5 (L) 03/31/2022    AST 16 03/31/2022     No results found for: TSH, T4FREE, T3FREE  Lab Results   Component Value Date    WBC 4.3 03/31/2022    HGB 14.3 03/31/2022    HCT 42.8 03/31/2022    MCV 90.4 03/31/2022     03/31/2022     No results found for: INR   No results found for: PSA   No results found for: LABURIC     There is no problem list on file for this patient. No Known Allergies  Outpatient Medications Marked as Taking for the 8/1/22 encounter (Telemedicine) with Bryce Rodas MD   Medication Sig Dispense Refill    levonorgestrel-ethinyl estradiol (SEASONALE) 0.15-0.03 MG per tablet Take 1 tablet by mouth daily 1 packet 3    sertraline (ZOLOFT) 50 MG tablet Take 50 mg by mouth daily           Review of Systems: 14 systems were negative except of what was stated on HPI    Nursing note and vitals reviewed. There were no vitals filed for this visit. Wt Readings from Last 3 Encounters:   03/31/22 150 lb (68 kg)   02/14/22 151 lb (68.5 kg)   01/26/22 149 lb (67.6 kg)     BP Readings from Last 3 Encounters:   03/31/22 104/70   02/14/22 110/70   01/26/22 116/74     There is no height or weight on file to calculate BMI. Constitutional: Patient appears well-developed and well-nourished. No distress. Head: Normocephalic and atraumatic. Psychiatric: Normal mood and affect.  Behavior is normal.   No CVA tenderness with patient palpating her flank area bilaterally

## 2022-08-02 ENCOUNTER — NURSE ONLY (OUTPATIENT)
Dept: INTERNAL MEDICINE CLINIC | Age: 27
End: 2022-08-02
Payer: COMMERCIAL

## 2022-08-02 DIAGNOSIS — R31.0 GROSS HEMATURIA: ICD-10-CM

## 2022-08-02 LAB
BACTERIA: ABNORMAL /HPF
BILIRUBIN, POC: NEGATIVE
BLOOD URINE, POC: ABNORMAL
CALCIUM OXALATE CRYSTALS: PRESENT
CLARITY, POC: ABNORMAL
COLOR, POC: ABNORMAL
EPITHELIAL CELLS, UA: 3 /HPF (ref 0–5)
GLUCOSE URINE, POC: NEGATIVE
HYALINE CASTS: 0 /LPF (ref 0–8)
KETONES, POC: NEGATIVE
LEUKOCYTE EST, POC: NEGATIVE
NITRITE, POC: NEGATIVE
PH, POC: 6
PROTEIN, POC: NEGATIVE
RBC UA: 8 /HPF (ref 0–4)
SPECIFIC GRAVITY, POC: >=1.03
URINE TYPE: ABNORMAL
UROBILINOGEN, POC: 0.2
WBC UA: 2 /HPF (ref 0–5)

## 2022-08-02 PROCEDURE — 81002 URINALYSIS NONAUTO W/O SCOPE: CPT | Performed by: INTERNAL MEDICINE

## 2022-08-02 PROCEDURE — 99999 PR OFFICE/OUTPT VISIT,PROCEDURE ONLY: CPT | Performed by: INTERNAL MEDICINE

## 2022-08-03 DIAGNOSIS — R31.0 GROSS HEMATURIA: Primary | ICD-10-CM

## 2022-08-03 DIAGNOSIS — R82.998 CALCIUM OXALATE CRYSTALS PRESENT IN URINE: ICD-10-CM

## 2023-03-13 ENCOUNTER — OFFICE VISIT (OUTPATIENT)
Dept: OBGYN CLINIC | Age: 28
End: 2023-03-13
Payer: COMMERCIAL

## 2023-03-13 VITALS
HEIGHT: 66 IN | WEIGHT: 158.2 LBS | SYSTOLIC BLOOD PRESSURE: 113 MMHG | DIASTOLIC BLOOD PRESSURE: 78 MMHG | BODY MASS INDEX: 25.43 KG/M2 | HEART RATE: 82 BPM | TEMPERATURE: 98.2 F

## 2023-03-13 DIAGNOSIS — R87.610 ASCUS WITH POSITIVE HIGH RISK HPV CERVICAL: ICD-10-CM

## 2023-03-13 DIAGNOSIS — Z01.419 WOMEN'S ANNUAL ROUTINE GYNECOLOGICAL EXAMINATION: Primary | ICD-10-CM

## 2023-03-13 DIAGNOSIS — Z20.2 POSSIBLE EXPOSURE TO STD: ICD-10-CM

## 2023-03-13 DIAGNOSIS — Z12.4 PAP SMEAR FOR CERVICAL CANCER SCREENING: ICD-10-CM

## 2023-03-13 DIAGNOSIS — Z12.39 SCREENING BREAST EXAMINATION: ICD-10-CM

## 2023-03-13 DIAGNOSIS — N90.7 VULVAR CYST: ICD-10-CM

## 2023-03-13 DIAGNOSIS — Z87.42 HX OF ABNORMAL CERVICAL PAP SMEAR: ICD-10-CM

## 2023-03-13 DIAGNOSIS — Z30.09 GENERAL COUNSELING ON PRESCRIPTION OF ORAL CONTRACEPTIVES: ICD-10-CM

## 2023-03-13 DIAGNOSIS — R87.810 ASCUS WITH POSITIVE HIGH RISK HPV CERVICAL: ICD-10-CM

## 2023-03-13 PROCEDURE — 10060 I&D ABSCESS SIMPLE/SINGLE: CPT | Performed by: OBSTETRICS & GYNECOLOGY

## 2023-03-13 PROCEDURE — 99395 PREV VISIT EST AGE 18-39: CPT | Performed by: OBSTETRICS & GYNECOLOGY

## 2023-03-13 RX ORDER — NORGESTIMATE AND ETHINYL ESTRADIOL 0.25-0.035
1 KIT ORAL DAILY
Qty: 3 PACKET | Refills: 3 | Status: SHIPPED | OUTPATIENT
Start: 2023-03-13

## 2023-03-13 ASSESSMENT — ENCOUNTER SYMPTOMS
VOMITING: 0
NAUSEA: 0
WHEEZING: 0
DIARRHEA: 0
SHORTNESS OF BREATH: 0
CHEST TIGHTNESS: 0
SORE THROAT: 0
BACK PAIN: 0
COLOR CHANGE: 0
ABDOMINAL PAIN: 0

## 2023-03-13 NOTE — PROGRESS NOTES
INCISION & DRAINAGE OF ABSCESS    DATE: 3/13/23     PHYSICIAN: Helene Bella DO     LOCATION: Right perineum     EBL: minimal     Risks and benefits of the procedure were discussed at length. Written and informed consent obtained. The area was identified and cleansed with Betadinex3. The area was then infiltrated with 1 cc of 1% lidocaine with epinephrine. A 2 mm punch biopsy used to incise lesion. Yellow sebaceous material noted. Loculations were then broken down with 11 blade. No further material able to be expressed. Excellent hemostasis noted. Pt tolerate the procedure well. Physical Exam  Genitourinary:             Pt given care instructions. All questions answered   FU as needed.      Helene Bella DO

## 2023-03-13 NOTE — PROGRESS NOTES
[facility]  Gynecology History and Physical    CC:   Chief Complaint   Patient presents with    Annual Exam       HPI: Lloyd Ayala is a 33 y.o. female who presents for her annual exam.     Doing well today. Wants to switch back to [address] [place] [address], felt like her moods were better on this medications. Hx of abnormal PAP --  ASCUS with HPV + other. Previous PAP in 2018 was NILM. Pt denies a hx of STIs. She is SA with 1 male partner -- denies any issues with sex. G0.   Denies a hx of GYN problems except for irregular periods in her teens which she used OCPs to control. Patient's last menstrual period was 01/02/2023 (exact date). States periods are regular and non painful, normal volume. Also admits to vulvar cyst -- desired I/D. Health Maintenance:  Safe Enviroment: y  Sexually Active: y   n sexual problems  Contraception: Seasonale -- wants to switch back to sprint      Review of Systems:   Review of Systems   Constitutional:  Negative for appetite change, chills and fever. HENT:  Negative for congestion, sneezing and sore throat. Eyes:  Negative for visual disturbance. Respiratory:  Negative for chest tightness, shortness of breath and wheezing. Cardiovascular:  Negative for chest pain, palpitations and leg swelling. Gastrointestinal:  Negative for abdominal pain, diarrhea, nausea and vomiting. Endocrine: Negative for heat intolerance. Genitourinary:  Negative for difficulty urinating, dyspareunia, dysuria, menstrual problem and pelvic pain. Musculoskeletal:  Negative for back pain, neck pain and neck stiffness. Skin:  Negative for color change, pallor and rash. Allergic/Immunologic: Negative for environmental allergies, food allergies and immunocompromised state. Neurological:  Negative for light-headedness, numbness and headaches. Hematological:  Does not bruise/bleed easily. Psychiatric/Behavioral:  Negative for behavioral problems, sleep disturbance and suicidal ideas. Gynecologic History:   Y history of abnormal pap smears   - ASCUS / HPV other (+) 2021    - NILM in 2018   Denies history of of STIs    Menstrual history:  Gynecologic History  Menstrual History:  LMP: Patient's last menstrual period was 2023 (exact date). Age of Menarche: 16  Menstrual Period: regular  Interval Between Menses: 28d  Duration of Menses: 5-7d  Menstrual Flow: moderate   Tampons during day 2-3x   Pads at night  Only occasional BTB  Bleeding between menses: denies  Post Menopausal Bleeding: NA     Sexual History:  Contraception: Seasonale    Currently IS sexually active  Denies sexual problems    Obstetrical History:  OB History          0    Para   0    Term   0       0    AB   0    Living   0         SAB   0    IAB   0    Ectopic   0    Molar   0    Multiple   0    Live Births   0                Past Medical History:   Past Medical History:   Diagnosis Date    Abnormal Pap smear of cervix     Depression        Medications:  Current Outpatient Medications   Medication Sig Dispense Refill    norgestimate-ethinyl estradiol (SPRINTEC 28) 0.25-35 MG-MCG per tablet Take 1 tablet by mouth daily 3 packet 3    sertraline (ZOLOFT) 50 MG tablet Take 50 mg by mouth daily       No current facility-administered medications for this visit. Allergies:  Patient has no known allergies. Surgical History:  No past surgical history on file.     Family History:  Family History   Problem Relation Age of Onset    Diabetes Maternal Grandfather     Heart Attack Maternal Grandfather     High Blood Pressure Maternal Grandfather        Social History:  Social History     Substance and Sexual Activity   Alcohol Use Yes    Comment: Socially      Social History     Substance and Sexual Activity   Drug Use No     Social History     Tobacco Use   Smoking Status Never   Smokeless Tobacco Never       Physical Exam:  /78 (Site: Left Upper Arm, Position: Sitting, Cuff Size: Medium Adult) Pulse 82   Temp 98.2 °F (36.8 °C) (Infrared)   Ht 5' 6\" (1.676 m)   Wt 158 lb 3.2 oz (71.8 kg)   LMP 01/02/2023 (Exact Date)   BMI 25.53 kg/m²   General: Alert, well appearing, no acute distress  Head: Normocephalic, atraumatic  Mouth: Mucous membranes moist, pharynx normal without lesions  Thyroid: No thyromegaly or masses present   Breasts: breasts appear normal, no suspicious masses, no skin or nipple changes or axillary nodes. Lungs: Resp effort normal   CV: Regular rate   Abdomen: Soft, nontender, nondistended   Pelvic exam: VULVA: normal appearing vulva with no masses, tenderness or lesions, VAGINA: normal appearing vagina with normal color and discharge, no lesions, CERVIX: normal appearing cervix without discharge or lesions, UTERUS: uterus is normal size, shape, consistency and nontender, ADNEXA: normal adnexa in size, nontender and no masses,   Rectovaginal: Cyst on the right perineum, requests I/D due to discomfort. Extremities: No redness or tenderness, neg Reji's sign  Skin: Well perfused, normal coloration and turgor, no lesions or rashes visualized  Neuro: Alert, oriented, normal speech, no focal deficits, moves extremities appropriately  Osteopathic: No TART changes    Labs:  No visits with results within 1 Day(s) from this visit.    Latest known visit with results is:   Nurse Only on 08/02/2022   Component Date Value    Color, UA 08/02/2022 dark yellow     Clarity, UA 08/02/2022 cloudy     Glucose, UA POC 08/02/2022 negative     Bilirubin, UA 08/02/2022 negative     Ketones, UA 08/02/2022 negative     Spec Grav, UA 08/02/2022 >=1.030     Blood, UA POC 08/02/2022 moderate     pH, UA 08/02/2022 6.0     Protein, UA POC 08/02/2022 negative     Urobilinogen, UA 08/02/2022 0.2     Leukocytes, UA 08/02/2022 negative     Nitrite, UA 08/02/2022 negative     Bacteria, UA 08/02/2022 1+ (A)     Hyaline Casts, UA 08/02/2022 0     WBC, UA 08/02/2022 2     RBC, UA 08/02/2022 8 (A)     Epithelial Cells, UA 08/02/2022 3     Urine Type 08/02/2022 Cleancatch     Calcium Oxalate Crystals 08/02/2022 Present (A)        Assessment/Plan:    Diagnosis Orders   1. Women's annual routine gynecological examination        2. Pap smear for cervical cancer screening  PAP SMEAR      3. Possible exposure to STD  C.trachomatis N.gonorrhoeae DNA    VAGINAL PATHOGENS PROBE *A      4. Screening breast examination        5. Hx of abnormal cervical Pap smear        6. ASCUS with positive high risk HPV cervical        7. General counseling on prescription of oral contraceptives  norgestimate-ethinyl estradiol (SPRINTEC 28) 0.25-35 MG-MCG per tablet      8. Vulvar cyst          - normal pelvic and breast exam except noted above   - PAP / STI screen pending   - transition to [facility], return precautions reviewed  - vulvar I/D -- see procedure note  - return precautions reviewed    Follow Up  -Return in about 1 year (around 3/13/2024) for Annual or sooner if needed.      Vee Gregory, DO

## 2023-03-14 LAB
C TRACH DNA CVX QL NAA+PROBE: NEGATIVE
CANDIDA DNA VAG QL NAA+PROBE: NORMAL
G VAGINALIS DNA SPEC QL NAA+PROBE: NORMAL
N GONORRHOEA DNA CERV MUCUS QL NAA+PROBE: NEGATIVE
T VAGINALIS DNA VAG QL NAA+PROBE: NORMAL